# Patient Record
Sex: MALE | Race: WHITE
[De-identification: names, ages, dates, MRNs, and addresses within clinical notes are randomized per-mention and may not be internally consistent; named-entity substitution may affect disease eponyms.]

---

## 2017-01-03 ENCOUNTER — HOSPITAL ENCOUNTER (OUTPATIENT)
Age: 62
Discharge: HOME | End: 2017-01-03
Payer: COMMERCIAL

## 2017-01-03 PROCEDURE — 94060 EVALUATION OF WHEEZING: CPT

## 2017-01-03 PROCEDURE — 94729 DIFFUSING CAPACITY: CPT

## 2017-01-03 PROCEDURE — 94726 PLETHYSMOGRAPHY LUNG VOLUMES: CPT

## 2019-09-03 ENCOUNTER — HOSPITAL ENCOUNTER (OUTPATIENT)
Dept: HOSPITAL 47 - SLEEP | Age: 64
Discharge: HOME | End: 2019-09-03
Attending: INTERNAL MEDICINE
Payer: COMMERCIAL

## 2019-09-03 DIAGNOSIS — I10: ICD-10-CM

## 2019-09-03 DIAGNOSIS — F34.1: ICD-10-CM

## 2019-09-03 DIAGNOSIS — M06.9: ICD-10-CM

## 2019-09-03 DIAGNOSIS — G89.29: ICD-10-CM

## 2019-09-03 DIAGNOSIS — Z79.899: ICD-10-CM

## 2019-09-03 DIAGNOSIS — G47.10: Primary | ICD-10-CM

## 2019-09-03 PROCEDURE — 99211 OFF/OP EST MAY X REQ PHY/QHP: CPT

## 2019-09-03 NOTE — CONS
CONSULTATION



REASON FOR CONSULTATION:

Sleep apnea.



This is a 64-year-old morbidly obese male patient who works at [employer]; he does mainly computer work.  Recently he has felt very tired and

sleepy, to the point where he is having a hard time keeping himself awake and he is

having some occasional sleep attacks.  Based on all this, he came in for a sleep apnea

evaluation.  His sleep is fragmented. He has rheumatoid arthritis and chronic pain and

arthralgia and he has also chronic dysthymia. In terms of his RA, he takes a

combination of gabapentin and methotrexate, and for his dysthymia he takes Effexor 75

mg p.o. daily.  He has gained a significant amount of weight over the years and he has

features of obstructive sleep apnea, as the patient snores and his sleep is fragmented

and he is occasionally waking up for choking and gasping sensation.  He sleeps with his

dogs. His wife has moved herself outside to another bedroom.  He wakes up tired during

the day.  He falls asleep during the day.  He has problems with memory and

concentration. He goes to bed between 10 and 11 p.m. and wakes up between 5 and 5:30

a.m. in the morning.  On weekends he wakes up at 7:00 in the morning.  No restlessness

in his lower extremities.



PAST MEDICAL HISTORY:

1. Obesity.

2. RA.

3. Hypertension.

4. Dysthymia.



PAST SURGICAL HISTORY:

Past surgical history includes cholecystectomy and upper and lower GI endoscopy.



DRUG ALLERGIES:

NOT KNOWN.



OUTPATIENT MEDICATION LIST:

Outpatient medication list includes:

1. Effexor 75 mg p.o. daily.

2. Enalapril 10 mg p.o. daily.

3. Folic acid 1 tablet a day.

4. Methotrexate injections and gabapentin 2 capsules once a day; the dose is not

    known.



SOCIAL HISTORY:

The patient is a cigar smoker.  He smokes around 40 cigars a week.  No history of

alcoholism. No history of IV drugs.



FAMILY HISTORY:

Positive for diabetes, coronary artery disease and CVA. Almost half of his family have

cardiac disease; the other half has CVA. Diabetes throughout the majority of his family

members.  No history of any sleep breathing disorder or sleep apnea in the family.



REVIEW OF SYSTEMS:

Fourteen-point review of systems was done.  Positive findings were all mentioned above

in the history of present illness.  He is snoring for now. No insomnia. No reported

falling asleep while driving his car; however, he drives only short distances.  There

is positive history of weight gain on the order of 75 pounds over the past 10 years.

He sleeps on his stomach. He takes naps any time he has a chance to do so. He takes

typically around 3-4 naps a day, especially on weekends.  No chest pain.  No shortness

of breath.  No cough or sputum production. Chronic arthralgias. He has sexual

dysfunction.



PHYSICAL EXAMINATION:

VITAL SIGNS: BP is 168/95, pulse 80, respirations 16, temperature 98.1, saturation 97%

on room air.  Neck size is _____ inches.  Height is 5 feet 7 inches. Weight is 358. BMI

is 56.

GENERAL APPEARANCE:  Calm, comfortable.

HEAD: Atraumatic, normocephalic.

NECK:  Supple.  Short. There is crowding of the posterior pharynx. Mallampati class IV.

No goiter or neck masses.

LUNGS:  Clear to auscultation.

HEART:  Heart sounds are regular rate and rhythm.  Normal S1, S2.  No S3, S4.  No

murmurs.

ABDOMEN:  Soft, nontender.  No organomegaly.

EXTREMITIES:  No edema.  No cyanosis or clubbing.

NEUROLOGIC:  Alert and oriented x3.  No focal neurological deficits.

PSYCHIATRIC:  History of depression/dysthymia.



IMPRESSION:

1. Chronic hypersomnia; [place] score of 13 with high suspicion for obstructive sleep

    apnea.

2. Loud snoring.

3. Witnessed apneas.

4. Sleep fragmentation.

5. Rheumatoid arthritis.

6. Chronic pain.

7. Chronic dysthymic, on Effexor.

8. Hypertension.



PLAN:

1. Encourage weight loss.

2. Optimize sleep hygiene measures.

3. Refrain from driving long distances, especially when feeling drowsy or sleepy.

4. Proceed with a screening polysomnogram to assess sleep quality and look for any

    significant sleep breathing disorder that may need to be treated.  Will continue to

    follow.





MMODL / IJN: [id number] / Job#: [id number]

## 2020-05-14 ENCOUNTER — HOSPITAL ENCOUNTER (OUTPATIENT)
Dept: HOSPITAL 47 - RADXRMAIN | Age: 65
Discharge: HOME | End: 2020-05-14
Attending: FAMILY MEDICINE
Payer: COMMERCIAL

## 2020-05-14 DIAGNOSIS — S89.91XA: Primary | ICD-10-CM

## 2020-05-14 NOTE — XR
EXAMINATION TYPE: XR knee complete RT

 

DATE OF EXAM: 5/14/2020

 

CLINICAL HISTORY: Pain after injury.

 

TECHNIQUE:  Three views of the right knee are obtained.

 

COMPARISON: None.

 

FINDINGS:  There is no acute fracture/dislocation evident in right knee. Mild tricompartment joint sp
ace loss. Increased density suprapatellar bursa suspicious for small joint effusion.

 

IMPRESSION:  There is no acute fracture or dislocation in the right knee.

## 2020-06-09 ENCOUNTER — HOSPITAL ENCOUNTER (OUTPATIENT)
Dept: HOSPITAL 47 - RADMRIMAIN | Age: 65
Discharge: HOME | End: 2020-06-09
Attending: ORTHOPAEDIC SURGERY
Payer: COMMERCIAL

## 2020-06-09 DIAGNOSIS — S83.241A: ICD-10-CM

## 2020-06-09 DIAGNOSIS — S83.411A: ICD-10-CM

## 2020-06-09 DIAGNOSIS — R60.9: Primary | ICD-10-CM

## 2020-06-10 NOTE — MR
EXAMINATION TYPE: MR knee RT wo con

 

DATE OF EXAM: 6/9/2020

 

COMPARISON: X-ray 5/29/2020 HISTORY: Right knee pain, twisting injury

 

TECHNIQUE: Multiplanar, multisequence imaging of the right knee is performed without IV contrast.

 

FINDINGS: Exam limited by artifact.

 

MEDIAL MENISCUS: There is a complex tear involving the posterior horn of the medial meniscus.

 

LATERAL MENISCUS: Anterior and posterior horns are intact without tear.

 

CRUCIATE LIGAMENTS: The anterior and posterior cruciate ligaments are intact and unremarkable.

 

COLLATERAL LIGAMENTS: There is edema surrounding the MCL compatible with strain. No through thickness
 tear. Lateral collateral ligament intact.

 

EXTENSOR MECHANISM: Visualized quadriceps and patellar tendons are intact. Trace amount fluid in the 
suprapatellar bursa.

 

EFFUSION:  No significant suprapatellar joint effusion.

 

POPLITEAL CYST:  No popliteal/baker cyst.

 

TRICOMPARTMENT SPACES: There is narrowing of the medial compartment of the knee joint without evidenc
e of erosive change. Grade II chondromalacia noted of the articular cartilage.

 

BONE MARROW SIGNAL: Large area of abnormal marrow edema involving the distal femur medially measuring
 5 cm. There is a flattening of the articular medial femoral condyle with a linear line suspicious fo
r a osteochondral defect and macrotrabecular fracture. No free fragment.

 

OTHER:  There is diffuse subcutaneous edema. 

 

 

IMPRESSION:

1. Exam limited by artifact demonstrates a large area of marrow edema involving the distal medial fem
ur standing the articular surface with findings suspicious for a impacted macrotrabecular fracture or
 osteochondral defect of the articular surface of the medial femoral condyle.

2. Complex posterior horn medial meniscal tear.

3. MCL sprain.

## 2020-07-01 ENCOUNTER — HOSPITAL ENCOUNTER (OUTPATIENT)
Dept: HOSPITAL 47 - LABWHC1 | Age: 65
Discharge: HOME | End: 2020-07-01
Attending: ORTHOPAEDIC SURGERY
Payer: COMMERCIAL

## 2020-07-01 DIAGNOSIS — M23.91: ICD-10-CM

## 2020-07-01 DIAGNOSIS — Z01.818: Primary | ICD-10-CM

## 2020-07-01 LAB
BASOPHILS # BLD AUTO: 0.1 K/UL (ref 0–0.2)
BASOPHILS NFR BLD AUTO: 1 %
EOSINOPHIL # BLD AUTO: 0.3 K/UL (ref 0–0.7)
EOSINOPHIL NFR BLD AUTO: 4 %
ERYTHROCYTE [DISTWIDTH] IN BLOOD BY AUTOMATED COUNT: 4.6 M/UL (ref 4.3–5.9)
ERYTHROCYTE [DISTWIDTH] IN BLOOD: 13.3 % (ref 11.5–15.5)
HCT VFR BLD AUTO: 46.7 % (ref 39–53)
HGB BLD-MCNC: 14.4 GM/DL (ref 13–17.5)
LYMPHOCYTES # SPEC AUTO: 1.9 K/UL (ref 1–4.8)
LYMPHOCYTES NFR SPEC AUTO: 28 %
MCH RBC QN AUTO: 31.3 PG (ref 25–35)
MCHC RBC AUTO-ENTMCNC: 30.9 G/DL (ref 31–37)
MCV RBC AUTO: 101.4 FL (ref 80–100)
MONOCYTES # BLD AUTO: 0.5 K/UL (ref 0–1)
MONOCYTES NFR BLD AUTO: 8 %
NEUTROPHILS # BLD AUTO: 4 K/UL (ref 1.3–7.7)
NEUTROPHILS NFR BLD AUTO: 57 %
PLATELET # BLD AUTO: 212 K/UL (ref 150–450)
WBC # BLD AUTO: 6.9 K/UL (ref 3.8–10.6)

## 2020-07-01 PROCEDURE — 93005 ELECTROCARDIOGRAM TRACING: CPT

## 2020-07-01 PROCEDURE — 36415 COLL VENOUS BLD VENIPUNCTURE: CPT

## 2020-07-01 PROCEDURE — 85025 COMPLETE CBC W/AUTO DIFF WBC: CPT

## 2020-07-01 PROCEDURE — 80051 ELECTROLYTE PANEL: CPT

## 2020-07-02 LAB
ANION GAP SERPL CALC-SCNC: 4.7 MMOL/L (ref 4–12)
CHLORIDE SERPL-SCNC: 108 MMOL/L (ref 96–109)
CO2 SERPL-SCNC: 29.3 MMOL/L (ref 21.6–31.8)
POTASSIUM SERPL-SCNC: 5 MMOL/L (ref 3.5–5.5)
SODIUM SERPL-SCNC: 142 MMOL/L (ref 135–145)

## 2020-07-06 NOTE — HP
HISTORY AND PHYSICAL



CHIEF COMPLAINT:

Right knee pain.



HISTORY OF PRESENT ILLNESS:

The patient is a 64-year-old gentleman who presents with right knee pain after an

injury on 04/17/2020.  He is getting into his truck when he felt a pop.  He notes pain

ever since.  He has tried an injection and medications without much relief.  He is

using a walker.



PAST MEDICAL HISTORY:

Significant for rheumatoid arthritis, depression, hypertension, and hypothyroidism.



PAST SURGICAL HISTORY:

Significant for previous right arm surgery, hernia repair, foot surgery and

cholecystectomy.



CURRENT MEDICATIONS:

Enalapril, famotidine, gabapentin, methotrexate, tumeric.



He denies drug allergies.



FAMILY HISTORY:

Significant for heart disease.



SOCIAL HISTORY:

Significant for tobacco use.



16 POINT REVIEW OF SYSTEMS:

Otherwise reviewed and is noncontributory.



PHYSICAL EXAMINATION:

On examination, the patient is approximately 5 foot 10, 350 pounds of endomorphic

habitus.

HEENT exam is nonfocal.

NECK:  Supple.

He has painless passive motion of the right hip.  Straight leg raise is negative.

Active motion right knee -10 to 105 degrees of flexion.  He is tender about the medial

joint line.  He has mild effusion.  Collaterals are stable, Lachman is negative,

Maria Luisa's elicits medial pain.  He has an antalgic gait pattern.  His distal

neurovascular appears intact in the right lower extremity.



MRI report right knee 06/09/2020 shows edema of the medial femoral condyle along with a

posterior medial meniscal tear and possible osteochondral defect to the medial femoral

condyle.



IMPRESSION:

1. Right knee internal derangement with symptomatic medial meniscal tear.

2. History of rheumatoid arthritis.

3. Obesity.



RECOMMENDATIONS:

I talked to the patient at length regarding his condition and treatment options.  At

this point, he is having significant pain and mechanical symptoms despite attempted

conservative measures.  After thorough discussion, he opts to proceed with surgery.  We

will plan to proceed with arthroscopic evaluation with probable partial medial

meniscectomy.  We will likely perform that as an outpatient procedure.  Risks and

benefits were discussed at length in layman's terms.





MMODL / IJN: [id number] / Job#: [id number]

## 2020-07-07 ENCOUNTER — HOSPITAL ENCOUNTER (OUTPATIENT)
Dept: HOSPITAL 47 - OR | Age: 65
Discharge: HOME | End: 2020-07-07
Attending: ORTHOPAEDIC SURGERY
Payer: COMMERCIAL

## 2020-07-07 VITALS — DIASTOLIC BLOOD PRESSURE: 82 MMHG | SYSTOLIC BLOOD PRESSURE: 151 MMHG | HEART RATE: 70 BPM

## 2020-07-07 VITALS — RESPIRATION RATE: 16 BRPM

## 2020-07-07 VITALS — TEMPERATURE: 98.4 F

## 2020-07-07 VITALS — BODY MASS INDEX: 47.3 KG/M2

## 2020-07-07 DIAGNOSIS — J44.9: ICD-10-CM

## 2020-07-07 DIAGNOSIS — Z87.442: ICD-10-CM

## 2020-07-07 DIAGNOSIS — Z88.5: ICD-10-CM

## 2020-07-07 DIAGNOSIS — I10: ICD-10-CM

## 2020-07-07 DIAGNOSIS — F17.200: ICD-10-CM

## 2020-07-07 DIAGNOSIS — Z98.890: ICD-10-CM

## 2020-07-07 DIAGNOSIS — F32.9: ICD-10-CM

## 2020-07-07 DIAGNOSIS — S83.241A: Primary | ICD-10-CM

## 2020-07-07 DIAGNOSIS — E66.9: ICD-10-CM
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
## 2020-07-07 DIAGNOSIS — Z87.19: ICD-10-CM

## 2020-07-07 DIAGNOSIS — M06.9: ICD-10-CM
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
## 2020-07-07 DIAGNOSIS — E03.9: ICD-10-CM

## 2020-07-07 DIAGNOSIS — K21.9: ICD-10-CM

## 2020-07-07 DIAGNOSIS — S89.81XA: ICD-10-CM

## 2020-07-07 DIAGNOSIS — M65.861: ICD-10-CM

## 2020-07-07 DIAGNOSIS — Z79.899: ICD-10-CM

## 2020-07-07 DIAGNOSIS — Z90.49: ICD-10-CM

## 2020-07-07 DIAGNOSIS — Z82.49: ICD-10-CM

## 2020-07-07 PROCEDURE — 29881 ARTHRS KNE SRG MNISECTMY M/L: CPT

## 2020-07-07 PROCEDURE — 29876 ARTHRS KNEE SURG SYNVCT MAJ: CPT

## 2020-07-07 NOTE — P.OP
Date of Procedure: 07/07/20


Preoperative Diagnosis: 


Internal derangement right knee


Postoperative Diagnosis: 


Right knee posterior medial meniscal tear/reactive synovitis of the medial, 

lateral, and patellofemoral compartments.


Procedure(s) Performed: 


Right knee arthroscopic partial medial meniscectomy/partial synovectomy of the 

medial, lateral, and patellofemoral compartments.


Anesthesia: WONG


Surgeon: Deacon Mcdonough


Estimated Blood Loss (ml): 10


Pathology: none sent


Condition: stable


Disposition: PACU


Indications for Procedure: 


The patient's a 64-year-old male who presents with progressive right knee pain 

and mechanical symptoms after a previous twisting injury.  A discussion of the 

risks and benefits of operative intervention versus continued conservative 

measures was made with the patient.  He opted proceed with surgery.  Operative 

risks to include infection, neurovascular injury, development of blood clots, 

possible incomplete resolution of symptoms, possible worsening symptoms and need

for subsequent procedures was discussed.  Informed consent was obtained.


Operative Findings: 


As below


Description of Procedure: 


The patient was brought to the operating room, and after induction of general 

anesthesia examined the right knee.  Collaterals were stable, Lachman was 

negative, and posterior drawer was negative.  The right lower extremity was 

prepped and draped in a normal fashion.  A superior lateral portal was made 

through a 3 mm skin incision superior and lateral to the patella.  This was used

for outflow.  A lateral portal was made through a 5 mm vertical skin incision 

lateral to the patella tendon above the joint line.  Diagnostic arthroscopy was 

performed.  On inspection of the medial compartment, a complex tear involving 

the posterior horn medial meniscus was noted in the white-red junction.  This 

was debrided back to stable base with straight baskets and a motorized shaver.  

A corresponding grade 2 chondral injury was noted involving the posterior 

lateral portion medial femoral condyle.  A loose chondral fragment was debrided 

back to stable base with a motorized shaver.  Reactive synovitis involving the 

anterior medial compartment was debrided with a motorized shaver.  On inspection

of the notch, the anterior cruciate ligament appeared to be intact.  On 

inspection of the lateral compartment, no definite meniscal pathology was noted.

 Reactive synovitis involving the anterolateral compartment was debrided with 

motorized shaver.    On inspection of the patellofemoral articulation, there is 

chondral fibrillation however no loose chondral fragments.  Reactive synovitis 

involving patellofemoral articulation was debrided with a motorized shaver..   

The gutters were clear debris.  The knee was then thoroughly irrigated.  The 

portals were closed with Steri-Strips.  A sterile dressing was applied in que

tion to a compression stocking.  The patient was awoken from general anesthesia 

and transferred to recovery room in good condition.  Blood loss was estimated at

10 mL.  No complications were incurred.

## 2023-08-18 ENCOUNTER — HOSPITAL ENCOUNTER (OUTPATIENT)
Dept: HOSPITAL 47 - RADCTMAIN | Age: 68
Discharge: HOME | End: 2023-08-18
Attending: STUDENT IN AN ORGANIZED HEALTH CARE EDUCATION/TRAINING PROGRAM
Payer: MEDICARE

## 2023-08-18 DIAGNOSIS — R29.818: ICD-10-CM

## 2023-08-18 DIAGNOSIS — I67.82: Primary | ICD-10-CM

## 2023-08-18 LAB
ANION GAP SERPL CALC-SCNC: 7 MMOL/L
BASOPHILS # BLD AUTO: 0 K/UL (ref 0–0.2)
BASOPHILS NFR BLD AUTO: 1 %
BUN SERPL-SCNC: 22 MG/DL (ref 9–20)
CALCIUM SPEC-MCNC: 9.2 MG/DL (ref 8.4–10.2)
CHLORIDE SERPL-SCNC: 103 MMOL/L (ref 98–107)
CO2 SERPL-SCNC: 29 MMOL/L (ref 22–30)
EOSINOPHIL # BLD AUTO: 0.3 K/UL (ref 0–0.7)
EOSINOPHIL NFR BLD AUTO: 4 %
ERYTHROCYTE [DISTWIDTH] IN BLOOD BY AUTOMATED COUNT: 4.65 M/UL (ref 4.3–5.9)
ERYTHROCYTE [DISTWIDTH] IN BLOOD: 12.3 % (ref 11.5–15.5)
GLUCOSE SERPL-MCNC: 107 MG/DL (ref 74–99)
HCT VFR BLD AUTO: 44.6 % (ref 39–53)
HGB BLD-MCNC: 14.9 GM/DL (ref 13–17.5)
LYMPHOCYTES # SPEC AUTO: 1.8 K/UL (ref 1–4.8)
LYMPHOCYTES NFR SPEC AUTO: 27 %
MCH RBC QN AUTO: 32 PG (ref 25–35)
MCHC RBC AUTO-ENTMCNC: 33.3 G/DL (ref 31–37)
MCV RBC AUTO: 96 FL (ref 80–100)
MONOCYTES # BLD AUTO: 0.3 K/UL (ref 0–1)
MONOCYTES NFR BLD AUTO: 5 %
NEUTROPHILS # BLD AUTO: 4 K/UL (ref 1.3–7.7)
NEUTROPHILS NFR BLD AUTO: 61 %
PLATELET # BLD AUTO: 210 K/UL (ref 150–450)
POTASSIUM SERPL-SCNC: 3.9 MMOL/L (ref 3.5–5.1)
SODIUM SERPL-SCNC: 139 MMOL/L (ref 137–145)
T4 FREE SERPL-MCNC: 1.32 NG/DL (ref 0.78–2.19)
WBC # BLD AUTO: 6.5 K/UL (ref 3.8–10.6)

## 2023-08-18 PROCEDURE — 84439 ASSAY OF FREE THYROXINE: CPT

## 2023-08-18 PROCEDURE — 82607 VITAMIN B-12: CPT

## 2023-08-18 PROCEDURE — 85025 COMPLETE CBC W/AUTO DIFF WBC: CPT

## 2023-08-18 PROCEDURE — 84443 ASSAY THYROID STIM HORMONE: CPT

## 2023-08-18 PROCEDURE — 70470 CT HEAD/BRAIN W/O & W/DYE: CPT

## 2023-08-18 PROCEDURE — 36415 COLL VENOUS BLD VENIPUNCTURE: CPT

## 2023-08-18 PROCEDURE — 80061 LIPID PANEL: CPT

## 2023-08-18 PROCEDURE — 80048 BASIC METABOLIC PNL TOTAL CA: CPT

## 2023-08-18 NOTE — CT
EXAMINATION TYPE: CT brain wo/w con

 

DATE OF EXAM: 8/18/2023

 

COMPARISON: None

 

INDICATION: Right sided posterior neck pain and headaches.

 

DLP: 2507.6 mGycm, Automated exposure control for dose reduction was used.

 

CONTRAST: 100 mL Isovue-300

 

CT of the brain is performed utilizing 3 mm thick sections through the posterior fossa and 3 mm thick
 sections through the remaining calvarium.  Study is performed within 24 hours of arrival to the hosp
ital. 

 

No abnormal hyperdensity is present to suggest an acute intracranial hemorrhage.

No mass lesion is evident.

No acute infarcts are evident. Patchy periventricular white matter hypodensity is present, likely on 
the basis of chronic white matter ischemic changes.

Ventricles and sulci are appropriate for the patient age.  Note is made of a patent cavum septum mason
dum and cavum vergae, normal variants.

There is a small retention cyst within the right maxillary sinus. Some opacification of the anterior 
right ethmoid air cell is present. Remaining paranasal sinuses and mastoid air cells are clear.

 

Following the intravenous administration of contrast, no abnormal enhancement is evident.

 

IMPRESSIONS:

1.   Chronic appearing periventricular white matter ischemic type changes.

2. No suspicious enhancement.

3. No acute intracranial process. Follow-up MRI can be performed as clinically indicated

## 2023-08-19 LAB
CHOLEST SERPL-MCNC: 137 MG/DL (ref 0–200)
HDLC SERPL-MCNC: 44.3 MG/DL (ref 40–60)
LDLC SERPL CALC-MCNC: 78.9 MG/DL (ref 0–131)
TRIGL SERPL-MCNC: 69.2 MG/DL (ref 0–149)
VIT B12 SERPL-MCNC: 179 PG/ML (ref 200–944)
VLDLC SERPL CALC-MCNC: 13.84 MG/DL (ref 5–40)

## 2023-09-13 ENCOUNTER — HOSPITAL ENCOUNTER (OUTPATIENT)
Dept: HOSPITAL 47 - RADUSWWP | Age: 68
Discharge: HOME | End: 2023-09-13
Attending: STUDENT IN AN ORGANIZED HEALTH CARE EDUCATION/TRAINING PROGRAM
Payer: MEDICARE

## 2023-09-13 DIAGNOSIS — R29.818: ICD-10-CM

## 2023-09-13 DIAGNOSIS — M54.16: ICD-10-CM

## 2023-09-13 DIAGNOSIS — M06.9: Primary | ICD-10-CM

## 2023-09-13 DIAGNOSIS — K21.9: ICD-10-CM

## 2023-09-13 DIAGNOSIS — I37.1: ICD-10-CM

## 2023-09-13 DIAGNOSIS — I35.1: ICD-10-CM

## 2023-09-13 DIAGNOSIS — G56.03: ICD-10-CM

## 2023-09-13 DIAGNOSIS — I34.0: ICD-10-CM

## 2023-09-13 DIAGNOSIS — J44.9: ICD-10-CM

## 2023-09-13 PROCEDURE — 93880 EXTRACRANIAL BILAT STUDY: CPT

## 2023-09-13 PROCEDURE — 93306 TTE W/DOPPLER COMPLETE: CPT

## 2023-09-14 NOTE — US
EXAMINATION TYPE: US carotid duplex BILAT

 

DATE OF EXAM: 9/13/2023

 

COMPARISON: NONE

 

CLINICAL INDICATION: Male, 68 years old with history of R29.818 OTHER SYMPTOMS AND SIGNS INVOLVING TH
E NER; pain rt trapezius, concern of stroke

 

TECHNIQUE: Carotid duplex ultrasound examination. Indirect Doppler criteria was utilized.

 

FINDINGS:

 

EXAM MEASUREMENTS: 

 

RIGHT:  Peak Systolic Velocity (PSV) cm/sec

----- Right CCA:  73.6

----- Right ICA:  81.4  

----- Right ECA:  116 

ICA/CCA ratio:  1.1  

 

RIGHT:  End Diastole cm/sec

----- Right CCA:  19.0

----- Right ICA:  30.9   

----- Right ECA:  19.6  

 

LEFT:  Peak Systolic Velocity (PSV) cm/sec

----- Left CCA:  63.0

----- Left ICA:  83.7

----- Left ECA:  105.0

ICA/CCA ratio:  1.3

 

LEFT:  End Diastole cm/sec

----- Left CCA:  22.0

----- Left ICA:  27.9

----- Left ECA:  17.8

 

VERTEBRALS (direction of flow):

Right Vertebral: Antegrade

Left Vertebral: Antegrade

 

Rhythm:  Normal

 

SONOGRAPHER NOTES:

 

exam technically difficult due to heavy breathing, and deep tortuous vessels

RT bulb plaque. LT mid/dist CCA, bulb plaque

 

IMPRESSION:  

 

1. Atheromatous plaquing without significant flow-limiting stenosis based on velocities.   

 

 

 

 

 

 

Criteria for Assigning % of Stenosis / Diameter reduction

(Estimation based on the indirect measurements of the internal carotid artery velocities (ICA PSV).

1.  Normal (no stenosis)=ICA PSV < 125 cm/s: ratio < 2.0: ICA EDV<40 cm/s.

2. Less than 50% stenosis=ICA PSV < 125 cm/s: ratio < 2.0: ICA EDV<40 cm/s.

3.  50 to 69% stenosis=ICA PSV of 125 to 230 cm/s: ration 2.0 ? 4.0: ICA EDV  cm/s.

4.  Greater than 70% stenosis to near occlusion= ICA PSV > 230 cm/s: ratio > 4.0: ICA EDV > 100 cm/s.
 

5.  Near occlusion= ICA PSV velocities may be low or undetectable: variable ratio and ICA EDV.

6.  Total occlusion=unable to detect flow.

## 2023-09-14 NOTE — CA
Transthoracic Echo Report 

 Name: Damien Glaser 

 MRN:    [id number] 

 Age:    68     Gender:     M 

 

 :    1955 

 Exam Date:     2023 15:12 

 Exam Location: [facility] 

 Ht (in):     69     Wt (lb):     305 

 Ordering Physician:        Kyle Mera MD 

 Attending/Referring Phys: 

 Technician         [facility] 

 Procedure CPT: 

 Indications:       R29.818 FOCAL NEUROLOGICAL DEFICIT 

 

 Cardiac Hx: 

 Technical Quality:      Fair 

 Contrast 1:                                Total Dose (mL): 

 Contrast 2:                                Total Dose (mL): 

 

 MEASUREMENTS  (Male / Female) Normal Values 

 2D ECHO 

 LV Diastolic Diameter PLAX        4.9 cm                4.2 - 5.9 / 3.9 - 5.3 cm 

 LV Systolic Diameter PLAX         3.1 cm                 

 IVS Diastolic Thickness           1.2 cm                0.6 - 1.0 / 0.6 - 0.9 cm 

 LVPW Diastolic Thickness          1.1 cm                0.6 - 1.0 / 0.6 - 0.9 cm 

 LV Relative Wall Thickness        0.5                    

 LVOT Diameter                     2.0 cm                 

 Ascending Aorta Diameter          3.9 cm                 

 

 M-MODE 

 Aortic Root Diameter MM           3.3 cm                 

 LA Systolic Diameter MM           6.0 cm                 

 LA Ao Ratio MM                    1.8                    

 AV Cusp Separation MM             1.5 cm                 

 

 DOPPLER 

 AV Peak Velocity                  192.4 cm/s             

 AV Peak Gradient                  14.8 mmHg              

 AV Mean Velocity                  143.9 cm/s             

 AV Mean Gradient                  9.1 mmHg               

 AV Velocity Time Integral         43.5 cm                

 LVOT Peak Velocity                108.4 cm/s             

 LVOT Peak Gradient                4.7 mmHg               

 LVOT Velocity Time Integral       23.2 cm                

 LVOT Stroke Volume                76.5 cm???               

 LVOT Stroke Volume Index          31.0 ml/m???             

 LVOT Cardiac Index                1865.5 cm???/min???m???      

 AV Area Cont Eq vti               1.8 cm???                

 AV Area Cont Eq pk                1.9 cm???                

 Mitral E Point Velocity           47.2 cm/s              

 Mitral A Point Velocity           66.0 cm/s              

 Mitral E to A Ratio               0.7                    

 MV Deceleration Time              147.9 ms               

 LV E' Lateral Velocity            8.9 cm/s               

 Mitral E to LV E' Lateral Ratio   5.3                    

 LV E' Septal Velocity             7.5 cm/s               

 Mitral E to LV E' Septal Ratio    6.3                    

 Right Atrial Pressure             3.0 mmHg               

 

 

 FINDINGS 

 Left Ventricle 

 Mildly increased left ventricular wall thickness. Left ventricular cavity size  

 normal. Normal left ventricular systolic function with no obvious regional wall  

 motion abnormalities. Left ventricular ejection fraction is estimated at 55- 

 60%. 

 

 Right Ventricle 

 Mild right ventricular dilatation. 

 

 Right Atrium 

 Moderate right atrial dilatation. 

 

 Left Atrium 

 Moderate left atrial dilatation. 

 

 Mitral Valve 

 Structurally normal mitral valve.  Mild mitral regurgitation. 

 

 Aortic Valve 

 Trileaflet aortic valve. Thickening of the aortic valve cusps. Aortic valve  

 sclerosis. Trace aortic regurgitation. 

 

 Tricuspid Valve 

 Structurally normal tricuspid valve. No tricuspid regurgitation. 

 

 Pulmonic Valve 

 Pulmonic valve not well visualized.trace pulmonic regurgitation. 

 

 Pericardium 

 No pericardial effusion. 

 

 Aorta 

 Normal size aortic root and mildly dilated proximal ascending aorta. 

 

 CONCLUSIONS 

 1.  Normal left ventricular size and systolic function 

 2.  Mild mitral regurgitation 

 3.  Trace aortic regurgitation 

 Previewed by:  

 Dr. Jean Maria MD 

 (Electronically Signed) 

 Final Date:      2023 08:51

## 2023-10-25 ENCOUNTER — HOSPITAL ENCOUNTER (OUTPATIENT)
Dept: HOSPITAL 47 - RADNMMAIN | Age: 68
Discharge: HOME | End: 2023-10-25
Attending: STUDENT IN AN ORGANIZED HEALTH CARE EDUCATION/TRAINING PROGRAM
Payer: MEDICARE

## 2023-10-25 DIAGNOSIS — E05.90: Primary | ICD-10-CM

## 2023-10-25 PROCEDURE — 78014 THYROID IMAGING W/BLOOD FLOW: CPT

## 2023-10-26 NOTE — NM
EXAMINATION TYPE: NM thyroid image w uptake

 

DATE OF EXAM: 10/26/2023

 

COMPARISON: NONE

 

CLINICAL INDICATION: Male, 68 years old with history of E05.90 THYROTOXICOSIS,;

 

TECHNIQUE:  Thyroid iodine uptake is calculated and images performed after the oral administration of
 316 uCi 1-123 Capsule.

 

FINDINGS: There is normal distribution of activity throughout the gland.  The 4 hour iodine uptake is
 calculated at 6.9% (normal range 8-14%). The 24-hour iodine uptake is calculated at 16.6% (normal ra
nge 15-35%).  

 

 

 

IMPRESSION: Thyroid uptake at 4 hours slightly below the normal range at 6.9 and within the normal ra
nge at 24 hours. Findings suggestive of the euthyroid.

## 2025-07-25 ENCOUNTER — HOSPITAL ENCOUNTER (OUTPATIENT)
Dept: HOSPITAL 47 - RADNMMAIN | Age: 70
Discharge: HOME | End: 2025-07-25
Payer: MEDICARE

## 2025-07-25 DIAGNOSIS — I34.0: ICD-10-CM

## 2025-07-25 DIAGNOSIS — I07.1: Primary | ICD-10-CM

## 2025-07-25 PROCEDURE — 93306 TTE W/DOPPLER COMPLETE: CPT

## 2025-07-25 PROCEDURE — 93017 CV STRESS TEST TRACING ONLY: CPT

## 2025-07-29 ENCOUNTER — HOSPITAL ENCOUNTER (OUTPATIENT)
Dept: HOSPITAL 47 - RADMAMWWP | Age: 70
Discharge: HOME | End: 2025-07-29
Payer: MEDICARE

## 2025-07-29 DIAGNOSIS — N64.4: ICD-10-CM

## 2025-07-29 DIAGNOSIS — R92.313: Primary | ICD-10-CM

## 2025-07-29 PROCEDURE — 77062 BREAST TOMOSYNTHESIS BI: CPT

## 2025-07-29 PROCEDURE — 77066 DX MAMMO INCL CAD BI: CPT
